# Patient Record
Sex: FEMALE | HISPANIC OR LATINO
[De-identification: names, ages, dates, MRNs, and addresses within clinical notes are randomized per-mention and may not be internally consistent; named-entity substitution may affect disease eponyms.]

---

## 2024-09-25 ENCOUNTER — NON-APPOINTMENT (OUTPATIENT)
Age: 59
End: 2024-09-25

## 2024-09-28 ENCOUNTER — APPOINTMENT (OUTPATIENT)
Dept: ORTHOPEDIC SURGERY | Facility: CLINIC | Age: 59
End: 2024-09-28
Payer: OTHER MISCELLANEOUS

## 2024-09-28 VITALS — BODY MASS INDEX: 25.2 KG/M2 | WEIGHT: 180 LBS | HEIGHT: 71 IN

## 2024-09-28 DIAGNOSIS — M47.22 OTHER SPONDYLOSIS WITH RADICULOPATHY, CERVICAL REGION: ICD-10-CM

## 2024-09-28 DIAGNOSIS — Z78.9 OTHER SPECIFIED HEALTH STATUS: ICD-10-CM

## 2024-09-28 DIAGNOSIS — I10 ESSENTIAL (PRIMARY) HYPERTENSION: ICD-10-CM

## 2024-09-28 DIAGNOSIS — S47.1XXA: ICD-10-CM

## 2024-09-28 DIAGNOSIS — E78.00 PURE HYPERCHOLESTEROLEMIA, UNSPECIFIED: ICD-10-CM

## 2024-09-28 DIAGNOSIS — E11.9 TYPE 2 DIABETES MELLITUS W/OUT COMPLICATIONS: ICD-10-CM

## 2024-09-28 PROBLEM — Z00.00 ENCOUNTER FOR PREVENTIVE HEALTH EXAMINATION: Status: ACTIVE | Noted: 2024-09-28

## 2024-09-28 PROCEDURE — 72040 X-RAY EXAM NECK SPINE 2-3 VW: CPT

## 2024-09-28 PROCEDURE — 73030 X-RAY EXAM OF SHOULDER: CPT | Mod: RT

## 2024-09-28 PROCEDURE — 73080 X-RAY EXAM OF ELBOW: CPT | Mod: RT

## 2024-09-28 PROCEDURE — 99204 OFFICE O/P NEW MOD 45 MIN: CPT

## 2024-09-28 PROCEDURE — 73010 X-RAY EXAM OF SHOULDER BLADE: CPT | Mod: RT

## 2024-09-28 RX ORDER — METHYLPREDNISOLONE 4 MG/1
4 TABLET ORAL
Qty: 1 | Refills: 0 | Status: ACTIVE | COMMUNITY
Start: 2024-09-28 | End: 1900-01-01

## 2024-09-28 RX ORDER — INSULIN ISOPHANE,PORK PURE 100/ML
VIAL (ML) SUBCUTANEOUS
Refills: 0 | Status: ACTIVE | COMMUNITY

## 2024-09-28 RX ORDER — ROSUVASTATIN CALCIUM 5 MG/1
TABLET, FILM COATED ORAL
Refills: 0 | Status: ACTIVE | COMMUNITY

## 2024-09-28 RX ORDER — PENICILLAMINE 250 MG/1
TABLET ORAL
Refills: 0 | Status: ACTIVE | COMMUNITY

## 2024-09-28 NOTE — WORK
[Crush Injury] : crush injury [Sprain/Strain] : sprain/strain [Partial] : partial [Does not reveal pre-existing condition(s) that may affect treatment/prognosis] : does not reveal pre-existing condition(s) that may affect treatment/prognosis
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
## 2024-09-28 NOTE — IMAGING
[Straightening consistent with spasm] : Straightening consistent with spasm [Facet arthropathy] : Facet arthropathy [Disc space narrowing] : Disc space narrowing [AC Joint Arthrosis] : AC Joint Arthrosis [Glenohumeral arthritis] : Glenohumeral arthritis [Type 2 acromion] : Type 2 acromion [Right] : right elbow [There are no fractures, subluxations or dislocations. No significant abnormalities are seen] : There are no fractures, subluxations or dislocations. No significant abnormalities are seen [de-identified] :   ----------------------------------------------------------------------------   Right shoulder exam:   Skin: no significant pertinent finding Inspection: no obvious deformity, no obvious masses, no swelling, no effusion, no atrophy ROM:    FF: 100 active due to pain.  170 passive    ER: 70     IR: lateral hip limited by pain Tenderness: +diffuse tenderness with limited stimuli Strength:    FF: 5/5    ER: 5/5    IR: 5/5    Biceps: 5/5    Triceps: 5/5    Distal: 5/5 Neuro: In tact to light touch throughout Vascularity: Extremity warm and well perfused   ----------------------------------------------------------------------------   Cervical spine exam:    Inspection:        (neg) Abnormal alignment (kyphosis/lordosis)   (neg) Atrophy ROM:    Pain:               (+) Flexion/extension    (+) Rotation    Stiffness:        (+) Flexion/extension    (+) Rotation Tenderness: +diffuse tenderness with limited stimuli  Strength:    Deltoid:          Right: 5/5   .  Left: 5/5    Biceps:          Right: 5/5   .  Left: 5/5    Triceps:         Right: 5/5   .  Left: 5/5    Wrist flex      Right: 5/5   .   Left: 5/5    Wrist ext:      Right: 5/5   .   Left: 5/5    Hand:            Right: 5/5   .   Left: 5/5 Neuro: DTR's wnl.  Sensation to light touch grossly in tact in all distributions.    (neg) Kay's    (neg) Spurling    (neg) Lhermitte Vascularity: Extremity warm and well perfused Gait: normal     ----------------------------------------------------------------------------     Right elbow exam:    Inspection:    (neg) Carrying angle deformity    (neg) Swellling    (neg) Olecranon bursa    (neg) Urbano ROM:   Flexion: 150    Extention: 0   Supination: 90      Pronation: 90 Tenderness:+diffuse tenderness with limited stimuli Additional tests:    (+) Pain with varus/valgus stress    (neg) Opening to varus/valgus stress    (neg) Milking test    (neg) Tinel's cubital tunnel              (neg) Subluxing ulnar nerve Strength: 5/5 Flexion/Extension/Pronation/Supination Neuro: In tact to light touch throughout all distributions distally Vascularity: Extremity warm and well perfused    [FreeTextEntry1] : multilevel djd
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
## 2024-09-28 NOTE — DATA REVIEWED
[Shoulder] : shoulder [MRI] : MRI [Right] : of the right [Elbow] : elbow [Report was reviewed and noted in the chart] : The report was reviewed and noted in the chart [FreeTextEntry1] : 8/5/24 [facility] - bursitis, interstitial supra tearing myotendinous junction, strain [FreeTextEntry2] : 8/5/24 [facility] - bone contusion and trabeuclar microfracture at radial head, elbow fluid

## 2024-10-19 ENCOUNTER — APPOINTMENT (OUTPATIENT)
Dept: MRI IMAGING | Facility: CLINIC | Age: 59
End: 2024-10-19

## 2024-11-08 ENCOUNTER — NON-APPOINTMENT (OUTPATIENT)
Age: 59
End: 2024-11-08

## 2024-11-17 ENCOUNTER — NON-APPOINTMENT (OUTPATIENT)
Age: 59
End: 2024-11-17